# Patient Record
Sex: MALE | NOT HISPANIC OR LATINO | Employment: FULL TIME | URBAN - METROPOLITAN AREA
[De-identification: names, ages, dates, MRNs, and addresses within clinical notes are randomized per-mention and may not be internally consistent; named-entity substitution may affect disease eponyms.]

---

## 2020-10-23 ENCOUNTER — HOSPITAL ENCOUNTER (OUTPATIENT)
Dept: ULTRASOUND IMAGING | Facility: HOSPITAL | Age: 25
Discharge: HOME | End: 2020-10-23
Attending: INTERNAL MEDICINE

## 2020-10-29 ENCOUNTER — HOSPITAL ENCOUNTER (OUTPATIENT)
Dept: LAB | Facility: HOSPITAL | Age: 25
Discharge: HOME | End: 2020-10-29
Attending: INTERNAL MEDICINE

## 2020-10-29 LAB
ALBUMIN SERPL-MCNC: 3.8 G/DL (ref 3.5–5)
ANION GAP SERPL CALC-SCNC: 15 MMOL/L (ref 8–19)
APPEARANCE UR: CLEAR
BASOPHILS # BLD AUTO: 0.07 10*3/UL (ref 0–0.2)
BASOPHILS NFR BLD AUTO: 0.9 % (ref 0–3)
BILIRUB UR QL: NEGATIVE
BUN SERPL-MCNC: 49 MG/DL (ref 5–25)
BUN/CREAT SERPL: 9 {RATIO} (ref 6–20)
CALCIUM SERPL-MCNC: 9.2 MG/DL (ref 8.7–10.4)
CHLORIDE SERPL-SCNC: 108 MMOL/L (ref 99–111)
COLOR UR: YELLOW
CONV ABS IMM GRAN: 0.04 10*3/UL (ref 0–0.2)
CONV BACTERIA: NEGATIVE
CONV CO2: 20 MMOL/L (ref 22–32)
CONV COLLECTION SOURCE (UA): ABNORMAL
CONV CREATININE URINE, RANDOM: 62.4 MG/DL (ref 10–300)
CONV IMMATURE GRAN: 0.5 % (ref 0–1.8)
CONV UROBILINOGEN IN URINE BY AUTOMATED TEST STRIP: 0.2 {EHRLICHU}/DL (ref 0.1–1)
CREAT UR-MCNC: 5.6 MG/DL (ref 0.7–1.2)
DEPRECATED RDW RBC AUTO: 41.8 FL (ref 35.1–43.9)
EOSINOPHIL # BLD AUTO: 0.37 10*3/UL (ref 0–0.7)
EOSINOPHIL # BLD AUTO: 4.8 % (ref 0–7)
ERYTHROCYTE [DISTWIDTH] IN BLOOD BY AUTOMATED COUNT: 12.9 % (ref 11.6–14.4)
GFR SERPLBLD BASED ON 1.73 SQ M-ARVRAT: 13 ML/MIN/{1.73_M2}
GLUCOSE SERPL-MCNC: 103 MG/DL (ref 70–99)
GLUCOSE UR QL: 100 MG/DL
HCT VFR BLD AUTO: 42.5 % (ref 42–52)
HGB BLD-MCNC: 13.3 G/DL (ref 14–18)
HGB UR QL STRIP: ABNORMAL
KETONES UR QL STRIP: NEGATIVE MG/DL
LEUKOCYTE ESTERASE UR QL STRIP: NEGATIVE
LYMPHOCYTES # BLD AUTO: 1.37 10*3/UL (ref 1–5)
LYMPHOCYTES NFR BLD AUTO: 17.9 % (ref 20–45)
MCH RBC QN AUTO: 27.7 PG (ref 27–31)
MCHC RBC AUTO-ENTMCNC: 31.3 G/DL (ref 33–37)
MCV RBC AUTO: 88.4 FL (ref 80–96)
MONOCYTES # BLD AUTO: 0.47 10*3/UL (ref 0.2–1.2)
MONOCYTES NFR BLD AUTO: 6.1 % (ref 3–10)
NEUTROPHILS # BLD AUTO: 5.34 10*3/UL (ref 2–8)
NEUTROPHILS NFR BLD AUTO: 69.8 % (ref 30–85)
NITRITE UR QL STRIP: NEGATIVE
NRBC CBCN: 0 % (ref 0–0.7)
PH UR STRIP.AUTO: 6 [PH] (ref 5–8)
PHOSPHATE SERPL-MCNC: 4.4 MG/DL (ref 2.4–4.5)
PLATELET # BLD AUTO: 256 10*3/UL (ref 130–400)
PMV BLD AUTO: 9.6 FL (ref 9.4–12.4)
POTASSIUM SERPL-SCNC: 4.4 MMOL/L (ref 3.5–5.3)
PROT UR QL: 300 MG/DL
PROT UR-MCNC: 399.6 MG/DL
RBC # BLD AUTO: 4.81 10*6/UL (ref 4.7–6.1)
RBC #/AREA URNS HPF: ABNORMAL /[HPF]
SODIUM SERPL-SCNC: 139 MMOL/L (ref 135–147)
SP GR UR: 1.01 (ref 1–1.03)
WBC # BLD AUTO: 7.66 10*3/UL (ref 4.8–10.8)
WBC #/AREA URNS HPF: ABNORMAL /[HPF]

## 2020-10-30 LAB — PTH-INTACT SERPL-MCNC: 548.5 PG/ML (ref 11.1–79.5)

## 2020-11-09 ENCOUNTER — HOSPITAL ENCOUNTER (OUTPATIENT)
Dept: CT IMAGING | Facility: HOSPITAL | Age: 25
Discharge: HOME | End: 2020-11-09
Attending: UROLOGY

## 2020-11-12 ENCOUNTER — TELEPHONE CONVERTED (OUTPATIENT)
Dept: UROLOGY | Facility: CLINIC | Age: 25
End: 2020-11-12
Attending: UROLOGY

## 2020-11-23 ENCOUNTER — HOSPITAL ENCOUNTER (OUTPATIENT)
Dept: NUCLEAR MEDICINE | Facility: HOSPITAL | Age: 25
Discharge: HOME | End: 2020-11-23
Attending: UROLOGY

## 2020-11-24 ENCOUNTER — HOSPITAL ENCOUNTER (OUTPATIENT)
Dept: NUTRITION | Facility: HOSPITAL | Age: 25
Setting detail: RECURRING SERIES
Discharge: HOME | End: 2021-02-22
Attending: INTERNAL MEDICINE

## 2020-11-25 ENCOUNTER — TELEPHONE CONVERTED (OUTPATIENT)
Dept: UROLOGY | Facility: CLINIC | Age: 25
End: 2020-11-25
Attending: UROLOGY

## 2020-12-03 ENCOUNTER — HOSPITAL ENCOUNTER (OUTPATIENT)
Dept: LAB | Facility: HOSPITAL | Age: 25
Discharge: HOME | End: 2020-12-03
Attending: INTERNAL MEDICINE

## 2020-12-03 LAB
ALBUMIN SERPL-MCNC: 3.6 G/DL (ref 3.5–5)
ANION GAP SERPL CALC-SCNC: 16 MMOL/L (ref 8–19)
APPEARANCE UR: CLEAR
BASOPHILS # BLD AUTO: 0.09 10*3/UL (ref 0–0.2)
BASOPHILS NFR BLD AUTO: 1 % (ref 0–3)
BILIRUB UR QL: NEGATIVE
BUN SERPL-MCNC: 52 MG/DL (ref 5–25)
BUN/CREAT SERPL: 9 {RATIO} (ref 6–20)
CALCIUM SERPL-MCNC: 8.5 MG/DL (ref 8.7–10.4)
CHLORIDE SERPL-SCNC: 107 MMOL/L (ref 99–111)
COLOR UR: YELLOW
CONV ABS IMM GRAN: 0.17 10*3/UL (ref 0–0.2)
CONV BACTERIA: NEGATIVE
CONV CO2: 20 MMOL/L (ref 22–32)
CONV COLLECTION SOURCE (UA): ABNORMAL
CONV CREATININE URINE, RANDOM: 38.6 MG/DL (ref 10–300)
CONV IMMATURE GRAN: 1.9 % (ref 0–1.8)
CONV PROTEIN TO CREATININE RATIO (RANDOM URINE): 7.74 {RATIO} (ref 0–0.1)
CONV UROBILINOGEN IN URINE BY AUTOMATED TEST STRIP: 0.2 {EHRLICHU}/DL (ref 0.1–1)
CREAT UR-MCNC: 5.56 MG/DL (ref 0.7–1.2)
DEPRECATED RDW RBC AUTO: 44 FL (ref 35.1–43.9)
EOSINOPHIL # BLD AUTO: 0.44 10*3/UL (ref 0–0.7)
EOSINOPHIL # BLD AUTO: 4.9 % (ref 0–7)
ERYTHROCYTE [DISTWIDTH] IN BLOOD BY AUTOMATED COUNT: 13.6 % (ref 11.6–14.4)
GFR SERPLBLD BASED ON 1.73 SQ M-ARVRAT: 13 ML/MIN/{1.73_M2}
GLUCOSE SERPL-MCNC: 95 MG/DL (ref 70–99)
GLUCOSE UR QL: 100 MG/DL
HCT VFR BLD AUTO: 40 % (ref 42–52)
HGB BLD-MCNC: 12.7 G/DL (ref 14–18)
HGB UR QL STRIP: ABNORMAL
KETONES UR QL STRIP: NEGATIVE MG/DL
LEUKOCYTE ESTERASE UR QL STRIP: NEGATIVE
LYMPHOCYTES # BLD AUTO: 1.72 10*3/UL (ref 1–5)
LYMPHOCYTES NFR BLD AUTO: 19.2 % (ref 20–45)
MCH RBC QN AUTO: 28.2 PG (ref 27–31)
MCHC RBC AUTO-ENTMCNC: 31.8 G/DL (ref 33–37)
MCV RBC AUTO: 88.7 FL (ref 80–96)
MONOCYTES # BLD AUTO: 0.55 10*3/UL (ref 0.2–1.2)
MONOCYTES NFR BLD AUTO: 6.1 % (ref 3–10)
NEUTROPHILS # BLD AUTO: 5.99 10*3/UL (ref 2–8)
NEUTROPHILS NFR BLD AUTO: 66.9 % (ref 30–85)
NITRITE UR QL STRIP: NEGATIVE
NRBC CBCN: 0 % (ref 0–0.7)
PH UR STRIP.AUTO: 6.5 [PH] (ref 5–8)
PHOSPHATE SERPL-MCNC: 6.4 MG/DL (ref 2.4–4.5)
PLATELET # BLD AUTO: 264 10*3/UL (ref 130–400)
PMV BLD AUTO: 10 FL (ref 9.4–12.4)
POTASSIUM SERPL-SCNC: 4.5 MMOL/L (ref 3.5–5.3)
PROT UR QL: 300 MG/DL
PROT UR-MCNC: 298.8 MG/DL
RBC # BLD AUTO: 4.51 10*6/UL (ref 4.7–6.1)
RBC #/AREA URNS HPF: ABNORMAL /[HPF]
SODIUM SERPL-SCNC: 138 MMOL/L (ref 135–147)
SP GR UR: 1.01 (ref 1–1.03)
WBC # BLD AUTO: 8.96 10*3/UL (ref 4.8–10.8)
WBC #/AREA URNS HPF: ABNORMAL /[HPF]

## 2021-03-22 ENCOUNTER — HOSPITAL ENCOUNTER (OUTPATIENT)
Dept: LAB | Facility: HOSPITAL | Age: 26
Discharge: HOME | End: 2021-03-22
Attending: INTERNAL MEDICINE

## 2021-03-22 LAB
ALBUMIN SERPL-MCNC: 3.7 G/DL (ref 3.5–5)
ANION GAP SERPL CALC-SCNC: 15 MMOL/L (ref 8–19)
BASOPHILS # BLD AUTO: 0.07 10*3/UL (ref 0–0.2)
BASOPHILS NFR BLD AUTO: 0.6 % (ref 0–3)
BUN SERPL-MCNC: 58 MG/DL (ref 5–25)
BUN/CREAT SERPL: 8 {RATIO} (ref 6–20)
CALCIUM SERPL-MCNC: 9 MG/DL (ref 8.7–10.4)
CHLORIDE SERPL-SCNC: 108 MMOL/L (ref 99–111)
CONV ABS IMM GRAN: 0.07 10*3/UL (ref 0–0.2)
CONV CO2: 16 MMOL/L (ref 22–32)
CONV IMMATURE GRAN: 0.6 % (ref 0–1.8)
CREAT UR-MCNC: 7.51 MG/DL (ref 0.7–1.2)
DEPRECATED RDW RBC AUTO: 44 FL (ref 35.1–43.9)
EOSINOPHIL # BLD AUTO: 0.72 10*3/UL (ref 0–0.7)
EOSINOPHIL # BLD AUTO: 6.7 % (ref 0–7)
ERYTHROCYTE [DISTWIDTH] IN BLOOD BY AUTOMATED COUNT: 13.5 % (ref 11.6–14.4)
GFR SERPLBLD BASED ON 1.73 SQ M-ARVRAT: 9 ML/MIN/{1.73_M2}
GLUCOSE SERPL-MCNC: 133 MG/DL (ref 70–99)
HCT VFR BLD AUTO: 40.3 % (ref 42–52)
HGB BLD-MCNC: 12.3 G/DL (ref 14–18)
LYMPHOCYTES # BLD AUTO: 1.47 10*3/UL (ref 1–5)
LYMPHOCYTES NFR BLD AUTO: 13.6 % (ref 20–45)
MCH RBC QN AUTO: 27.6 PG (ref 27–31)
MCHC RBC AUTO-ENTMCNC: 30.5 G/DL (ref 33–37)
MCV RBC AUTO: 90.4 FL (ref 80–96)
MONOCYTES # BLD AUTO: 0.48 10*3/UL (ref 0.2–1.2)
MONOCYTES NFR BLD AUTO: 4.5 % (ref 3–10)
NEUTROPHILS # BLD AUTO: 7.96 10*3/UL (ref 2–8)
NEUTROPHILS NFR BLD AUTO: 74 % (ref 30–85)
NRBC CBCN: 0 % (ref 0–0.7)
PHOSPHATE SERPL-MCNC: 6.8 MG/DL (ref 2.4–4.5)
PLATELET # BLD AUTO: 281 10*3/UL (ref 130–400)
PMV BLD AUTO: 10 FL (ref 9.4–12.4)
POTASSIUM SERPL-SCNC: 3.9 MMOL/L (ref 3.5–5.3)
RBC # BLD AUTO: 4.46 10*6/UL (ref 4.7–6.1)
SODIUM SERPL-SCNC: 135 MMOL/L (ref 135–147)
WBC # BLD AUTO: 10.77 10*3/UL (ref 4.8–10.8)

## 2021-03-25 ENCOUNTER — OFFICE VISIT (OUTPATIENT)
Dept: SURGERY | Facility: CLINIC | Age: 26
End: 2021-03-25

## 2021-03-25 VITALS — HEIGHT: 70 IN | BODY MASS INDEX: 45.1 KG/M2 | WEIGHT: 315 LBS

## 2021-03-25 DIAGNOSIS — L73.2 HYDRADENITIS: Primary | ICD-10-CM

## 2021-03-25 PROCEDURE — 99244 OFF/OP CNSLTJ NEW/EST MOD 40: CPT | Performed by: SURGERY

## 2021-03-25 RX ORDER — FUROSEMIDE 20 MG/1
20 TABLET ORAL DAILY
COMMUNITY

## 2021-03-25 RX ORDER — SERTRALINE HYDROCHLORIDE 100 MG/1
100 TABLET, FILM COATED ORAL NIGHTLY
COMMUNITY

## 2021-03-25 RX ORDER — ERGOCALCIFEROL 1.25 MG/1
50000 CAPSULE ORAL WEEKLY
COMMUNITY

## 2021-03-25 RX ORDER — CALCIUM ACETATE 667 MG/1
1334 CAPSULE ORAL 3 TIMES DAILY
COMMUNITY

## 2021-03-25 RX ORDER — AMLODIPINE BESYLATE 10 MG/1
10 TABLET ORAL DAILY
COMMUNITY

## 2021-03-25 NOTE — PROGRESS NOTES
Chief Complaint   Patient presents with   • PD Catheter Consultation       Subjective      Moe Castano is a 25 y.o. male who is referred by Dr. Boston for evaluation of peritoneal dialysis catheter placement.  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  The patient has been having worsening chronic kidney disease since he was born.  He is close to needing dialysis.  He reports having hidradenitis suppurativa.  He has been treated with antibiotics but cannot recall which type of antibiotic he is taking.  Patient cannot recall any medication he is taking.  He reports no intra-abdominal infection.  Has never had a colonoscopy.  Reports no constipation having regular bowel movements.    Home Evaluation: No    Past Medical History:   Diagnosis Date   • Anxiety    • Congenital absence of kidney    • Gout    • Hypertension    • Hyperthyroidism    • Kidney failure     stage 5   • Single pelvic kidney        Past Surgical History:   Procedure Laterality Date   • KIDNEY SURGERY  2004    excess fluid drained off of kidney       MEDS:   -Does not recall medications he is taking    Allergies   Allergen Reactions   • Shellfish-Derived Products Anaphylaxis       Family History   Problem Relation Age of Onset   • Diabetes Mother    • Heart disease Father         with pacemaker   • Hypertension Father    • Kidney failure Father    • Diabetes Father    • Arthritis Father        Social History     Socioeconomic History   • Marital status: Single     Spouse name: Not on file   • Number of children: Not on file   • Years of education: Not on file   • Highest education level: Not on file   Tobacco Use   • Smoking status: Never Smoker   • Smokeless tobacco: Never Used   Vaping Use   • Vaping Use: Never used   Substance and Sexual Activity   • Alcohol use: Never   • Drug use: Never   • Sexual activity: Defer     REVIEW OF SYSTEMS    Review of Systems   Constitutional: Negative for activity change and unexpected  "weight change.   HENT: Negative for congestion and hearing loss.    Eyes: Negative for discharge and itching.   Respiratory: Negative for apnea, choking and chest tightness.    Cardiovascular: Negative for chest pain and leg swelling.   Gastrointestinal: Negative for abdominal distention, anal bleeding and blood in stool.   Endocrine: Negative for cold intolerance and heat intolerance.   Genitourinary: Negative for difficulty urinating and hematuria.   Musculoskeletal: Negative for arthralgias and joint swelling.   Skin: Negative for color change and pallor.   Allergic/Immunologic: Negative for environmental allergies and food allergies.   Neurological: Negative for dizziness and numbness.   Hematological: Negative for adenopathy. Does not bruise/bleed easily.   Psychiatric/Behavioral: Negative for agitation and hallucinations.       Physical Examination  Ht 177.8 cm (70\")   Wt (!) 144 kg (317 lb)   BMI 45.48 kg/m²   Body mass index is 45.48 kg/m².  Physical Exam  Constitutional:       Appearance: He is obese.   HENT:      Head: Normocephalic and atraumatic.      Mouth/Throat:      Mouth: Mucous membranes are moist.      Pharynx: Oropharynx is clear.   Eyes:      General: No scleral icterus.     Conjunctiva/sclera: Conjunctivae normal.   Cardiovascular:      Rate and Rhythm: Normal rate and regular rhythm.      Pulses: Normal pulses.      Heart sounds: Normal heart sounds.   Pulmonary:      Effort: Pulmonary effort is normal.      Breath sounds: Normal breath sounds.   Abdominal:      General: Abdomen is flat. Bowel sounds are normal.      Palpations: There is no mass.      Hernia: No hernia is present.      Comments: There is bilateral groin boils with active purulent drainage and surrounding erythema   Musculoskeletal:         General: Normal range of motion.      Cervical back: Normal range of motion and neck supple.   Skin:     General: Skin is warm and dry.   Neurological:      General: No focal deficit " present.      Mental Status: He is alert. Mental status is at baseline.   Psychiatric:         Mood and Affect: Mood normal.         Thought Content: Thought content normal.         Assessment:   Moe Castano is a 25 y.o. male with kidney disease.we will need to have dialysis soon.  Unfortunately, he has active hidradenitis instruction.  He does not know which kind of antibiotic he is taking.  He has not been seen by a dermatologist.  I do not think placing a peritoneal dialysis catheter is great idea in the setting of active skin infection.  Discussed with the patient about this and also discussed it with Dr. Boston over the phone.  I will refer him to dermatology for treatment of hidradenitis suppurativa.  I think that if his hidradenitis suppurativa is under control then he should be able to undergo peritoneal dialysis catheter.  In the meantime, unfortunately he may need to have temporary hemodialysis.      Plan:     -Dermatology referral  -Follow-up in my office when infection under control    Orders Placed This Encounter   Procedures   • Ambulatory Referral to Dermatology     Referral Priority:   Routine     Referral Type:   Consultation     Referral Reason:   Specialty Services Required     Requested Specialty:   Dermatology     Number of Visits Requested:   1         Hever Chan MD  General, Minimally Invasive and Endoscopic Surgery  [facility]    [address], [address]  [place], [place], [address]  P: [phone]  F: [phone]

## 2021-03-26 ENCOUNTER — HOSPITAL ENCOUNTER (OUTPATIENT)
Dept: PREADMISSION TESTING | Facility: HOSPITAL | Age: 26
Discharge: HOME | End: 2021-03-26
Attending: SURGERY

## 2021-03-26 LAB — SARS-COV-2 RNA SPEC QL NAA+PROBE: NOT DETECTED

## 2021-03-29 ENCOUNTER — HOSPITAL ENCOUNTER (OUTPATIENT)
Dept: PERIOP | Facility: HOSPITAL | Age: 26
Discharge: HOME | End: 2021-03-29
Attending: SURGERY

## 2021-03-29 LAB
ANION GAP SERPL CALC-SCNC: 19 MMOL/L (ref 8–19)
APTT BLD: 26.3 S (ref 22.2–34.2)
BASOPHILS # BLD AUTO: 0.09 10*3/UL (ref 0–0.2)
BASOPHILS NFR BLD AUTO: 0.9 % (ref 0–3)
BUN SERPL-MCNC: 65 MG/DL (ref 5–25)
BUN/CREAT SERPL: 9 {RATIO} (ref 6–20)
CALCIUM SERPL-MCNC: 8.6 MG/DL (ref 8.7–10.4)
CHLORIDE SERPL-SCNC: 109 MMOL/L (ref 99–111)
CONV ABS IMM GRAN: 0.16 10*3/UL (ref 0–0.2)
CONV CO2: 14 MMOL/L (ref 22–32)
CONV IMMATURE GRAN: 1.6 % (ref 0–1.8)
CREAT UR-MCNC: 7.58 MG/DL (ref 0.7–1.2)
DEPRECATED RDW RBC AUTO: 42 FL (ref 35.1–43.9)
EOSINOPHIL # BLD AUTO: 0.89 10*3/UL (ref 0–0.7)
EOSINOPHIL # BLD AUTO: 8.7 % (ref 0–7)
ERYTHROCYTE [DISTWIDTH] IN BLOOD BY AUTOMATED COUNT: 13.5 % (ref 11.6–14.4)
GFR SERPLBLD BASED ON 1.73 SQ M-ARVRAT: 9 ML/MIN/{1.73_M2}
GLUCOSE SERPL-MCNC: 104 MG/DL (ref 70–99)
HCT VFR BLD AUTO: 37.4 % (ref 42–52)
HGB BLD-MCNC: 12.1 G/DL (ref 14–18)
INR PPP: 0.94 (ref 2–3)
LYMPHOCYTES # BLD AUTO: 1.38 10*3/UL (ref 1–5)
LYMPHOCYTES NFR BLD AUTO: 13.6 % (ref 20–45)
MCH RBC QN AUTO: 28 PG (ref 27–31)
MCHC RBC AUTO-ENTMCNC: 32.4 G/DL (ref 33–37)
MCV RBC AUTO: 86.6 FL (ref 80–96)
MONOCYTES # BLD AUTO: 0.47 10*3/UL (ref 0.2–1.2)
MONOCYTES NFR BLD AUTO: 4.6 % (ref 3–10)
NEUTROPHILS # BLD AUTO: 7.19 10*3/UL (ref 2–8)
NEUTROPHILS NFR BLD AUTO: 70.6 % (ref 30–85)
NRBC CBCN: 0 % (ref 0–0.7)
OSMOLALITY SERPL CALC.SUM OF ELEC: 305 MOSM/KG (ref 273–304)
PLATELET # BLD AUTO: 265 10*3/UL (ref 130–400)
PMV BLD AUTO: 9.9 FL (ref 9.4–12.4)
POTASSIUM SERPL-SCNC: 4.4 MMOL/L (ref 3.5–5.3)
PROTHROMBIN TIME: 10.4 S (ref 9.4–12)
RBC # BLD AUTO: 4.32 10*6/UL (ref 4.7–6.1)
SODIUM SERPL-SCNC: 138 MMOL/L (ref 135–147)
WBC # BLD AUTO: 10.18 10*3/UL (ref 4.8–10.8)

## 2021-04-01 LAB
CONV HEPATITIS B SURFACE AG W CONFIRMATION RE: NEGATIVE
HAV IGM SERPL QL IA: NEGATIVE
HBV CORE IGM SERPL QL IA: NEGATIVE
HCV AB SER DONR QL: <0.1 S/CO RATIO (ref 0–0.9)

## 2021-05-10 NOTE — H&P
History and Physical      Patient Name: Moe Burt   Patient ID: [id number]   Sex: Male   YOB: 1995        Visit Date: November 12, 2020    Provider: Gabi Greer MD   Location: [facility]   Location Address: [address] [place] [address]  [id number]   Location Phone: [phone]          Chief Complaint  · Pt here today for urological concerns     Telephone Visit- presents for evaluation via telephone.   Verbal consent obtained before beginning visit.   The following staff were present during this visit: Stacey Tomas LPN  Total time for encounter: 22 minutes       History Of Present Illness     25-year-old gentleman presents to establish urologic care.  Patient has history of chronic kidney disease classified as stage V.  History of renal failure starting in childhood.  Dx with solitary right pelvic kidney at age 7. History of nephrostomy tube placement and presumed pyeloplasty in [place] at about age 8.  Operative report available from 2003, dismembered pyeloplasty.  At 16yo dx with CKD III (GFR 44-32) which got progressively worse.  Recently, upon establishing care with PCP, routine labs were checked and patient was noted to have significantly elevated creatinine.  He was subsequently sent to nephrology, Dr. Boston for evaluation where renal ultrasound was obtained.    Feeling well today. Had CT scan prior to today's visit.  Denies abdominal or back pain.  Denies dysuria.  Notes urinary urgency and frequency but drinks up to a gallon of water a day.  Denies sensation of incomplete emptying.  Not currently on dialysis; starting dialysis discussed with nephrology.   Denies history of nephrolithiasis.    _________________  Creatinine,   10/29/2020: 5.6 GFR 13  10/20/20: 6.3 GFR 11    CT abdomen pelvis without contrast, 11/9/2020: Solitary right pelvic kidney with mild prominence of the collecting system with no evidence of   significant calyceal dilatation to  suggest hydronephrosis.  No obstructing calculus identified.  No   remote previous imaging available for comparison.  No acute process.    Renal ultrasound, 10/23/2020: left absent kidney, right kidney is located in the pelvis area increased echogenicity of the cortex suggesting chronic renal disease.  Lobulated contour of the right kidney.  Moderate dilation of the right renal pelvis of uncertain significance.  Unremarkable bladder.       Past Medical History  ADHD (attention deficit hyperactivity disorder); CKD (chronic kidney disease); Gout; Hypertension; Sleep apnea         Past Surgical History  Urostomy         Medication List  amlodipine 5 mg oral tablet; Vitamin D2 1,250 mcg (50,000 unit) oral capsule; Zoloft 50 mg oral tablet         Allergy List  NO KNOWN DRUG ALLERGIES; Seafood       Allergies Reconciled  Family Medical History  Liver Disorder; Heart Disease; Hypertension; Thyroid disorder; Kidney Disease; Diabetes         Social History  Tobacco (Never)         Review of Systems  · Constitutional  o Denies  o : chills, fever  · Gastrointestinal  o Denies  o : nausea, vomiting          Results     [facility]      PACS RADIOLOGY REPORT    Patient: DEXTER NELSON Acct: #[id number] Report: #[id number]    UNIT #: [id number]  DOS: 20 1606  INSURANCE:BLUE ACCESS NETWORK - [facility] ORDER #:[id number]  LOCATION:CT   : 1995    PROVIDERS  ADMITTING:   ATTENDING: RAMÍREZ FORREST  FAMILY:  NONE,MD ORDERING:  RAMÍREZ FORREST   OTHER:  DICTATING:  Kamryn Patel MD    REQ #:[id number]   EXAM:ABDPELWO - CT ABDOMEN PELVIS wo CONTRAST  REASON FOR EXAM:  HYRDRONEPHROSIS/SOLITARY KIDNEY  REASON FOR VISIT:  HYRDRONEPHROSIS/SOLITARY KIDNEY    *******Signed******     PROCEDURE: CT ABDOMEN PELVIS WITHOUT CONTRAST     COMPARISON: [facility], , US BILAT KIDNEYS, 10/23/2020, 15:04.     INDICATIONS: HYRDRONEPHROSIS/SOLITARY KIDNEY     TECHNIQUE: CT images  were created without intravenous contrast.       PROTOCOL:   Standard imaging protocol performed      RADIATION:   DLP: 1350mGy*cm    Automated exposure control was utilized to minimize radiation dose.      FINDINGS:   The visualized portions of the lung bases demonstrate no acute process.      The liver, pancreas and spleen demonstrate no acute process.  The gallbladder appears unremarkable.    No evidence of biliary dilatation. The adrenal glands appear unremarkable.  A solitary right   pelvic kidney is present.  There is mild prominence of the collecting system with no evidence of   calyceal dilatation.  No evidence of stones.  No evidence of stranding.     No dilated  loops of bowel identified.  No evidence of obstruction.  No free air.  No mesenteric   fluid collections identified.  The appendix appears unremarkable.  No suspicious adenopathy   identified.  No significant free fluid.  The pelvic organs demonstrate no acute process.  No acute   osseous abnormality is identified.        CONCLUSION:   1. Solitary right pelvic kidney with mild prominence of the collecting system with no evidence of   significant calyceal dilatation to suggest hydronephrosis.  No obstructing calculus identified.  No   remote previous imaging available for comparison.  No acute process..  2. Ancillary findings as described above.         STEFANIE GEE MD         Electronically Signed and Approved By: STEFANIE GEE MD on 11/09/2020 at 16:24                     Until signed, this is an unconfirmed preliminary report that may contain  errors and is subject to change.                KOGIL:  D:11/09/20 1623         Assessment  · Pelvic kidney     753.3/Q63.2  · Solitary kidney, congenital     753.0/Q60.0  · CKD stage 5       Chronic kidney disease, stage 5     250.41/N18.5    Problems Reconciled  Plan  · Orders  o Physician Telephone Evaluation, 21-30 minutes (17555) - 753.3/Q63.2, 753.0/Q60.0, 250.41/N18.5 -  11/12/2020  · Medications  o Medications have been Reconciled  o Transition of Care or Provider Policy  · Instructions  o Electronically Identified Patient Education Materials Provided Electronically     Right solitary pelvic kidneyCT imaging reviewed as well as prior records available.    Patient with prominence of the right renal pelvis and collecting system without evidence to suggest significant hydronephrosis or obstructive uropathy.  No nephrolithiasis.  Discussed that these changes may be due to prior pyeloplasty.  Recommend obtaining MAG3 Lasix renal scan to assess for obstruction which if present could potentially be optimized in an attempt to improve renal function.  If MAG3 Lasix renal scan is without significant obstruction, then will likely warrant renal replacement therapy with future transplant evaluation.  Patient participate in the discussion and notes understanding.    Obtain MAG3 Lasix renal scan follow-up phone visit after  All questions addressed             Electronically Signed by: Gabi Greer MD -Author on November 12, 2020 06:11:27 PM

## 2021-05-13 NOTE — PROGRESS NOTES
Progress Note      Patient Name: Moe Burt   Patient ID: [id number]   Sex: Male   YOB: 1995        Visit Date: November 25, 2020    Provider: Gabi Greer MD   Location: [facility]   Location Address: [address] [place] [address]  [id number]   Location Phone: [phone]          Chief Complaint  · Pt here today for urological concerns     Telephone Visit- presents for evaluation via telephone.   Verbal consent obtained before beginning visit.   The following staff were present during this visit: Stacey Tomas LPN  Total time for encounter: 8 minutes       History Of Present Illness     25-year-old gentleman presents to establish urologic care.  Patient has history of chronic kidney disease classified as stage V.  History of renal failure starting in childhood.  Dx with solitary right pelvic kidney at age 7. History of nephrostomy tube placement and presumed pyeloplasty in [place] at about age 8.  Operative report available from 2003, dismembered pyeloplasty.  At 18yo dx with CKD III (GFR 44-32) which got progressively worse.  Recently, upon establishing care with PCP, routine labs were checked and patient was noted to have significantly elevated creatinine.  He was subsequently sent to nephrology, Dr. Boston for evaluation where renal ultrasound was obtained.    Feeling well today. Had CT scan prior to today's visit.  Denies abdominal or back pain.  Denies dysuria.  Notes urinary urgency and frequency but drinks up to a gallon of water a day.  Denies sensation of incomplete emptying.  Not currently on dialysis; starting dialysis discussed with nephrology.   Denies history of nephrolithiasis.    Update 11/25/20: Presents for follow-up with Lasix renal scan prior.  Denies changes since last visit.  No complaints today.  Patient has consultation with transplant service in [place] on Monday.    _________________  MAG3 Lasix scan, 11/23/2020:1. A solitary  right pelvic kidney.  Time activity curves are somewhat inaccurate due to the fact that the kidney overlies the right iliac artery and vein where there is persistent activity   throughout this exam.  Images suggest fairly rapid washout of activity from the right renal pelvis   following the administration of Lasix.  Mechanical obstruction of the right kidney is felt to be   unlikely.     Creatinine,   10/29/2020: 5.6 GFR 13  10/20/20: 6.3 GFR 11    CT abdomen pelvis without contrast, 11/9/2020: Solitary right pelvic kidney with mild prominence of the collecting system with no evidence of   significant calyceal dilatation to suggest hydronephrosis.  No obstructing calculus identified.  No   remote previous imaging available for comparison.  No acute process.    Renal ultrasound, 10/23/2020: left absent kidney, right kidney is located in the pelvis area increased echogenicity of the cortex suggesting chronic renal disease.  Lobulated contour of the right kidney.  Moderate dilation of the right renal pelvis of uncertain significance.  Unremarkable bladder.       Past Medical History  ADHD (attention deficit hyperactivity disorder); CKD (chronic kidney disease); Gout; Hypertension; Sleep apnea         Past Surgical History  Urostomy         Medication List  Risperdal oral; Vitamin D2 1,250 mcg (50,000 unit) oral capsule; Zoloft 50 mg oral tablet         Allergy List  NO KNOWN DRUG ALLERGIES; Seafood       Allergies Reconciled  Family Medical History  Liver Disorder; Heart Disease; Hypertension; Thyroid disorder; Kidney Disease; Diabetes         Social History  Tobacco (Never)         Review of Systems  · Constitutional  o Denies  o : chills, fever  · Gastrointestinal  o Denies  o : nausea, vomiting          Results     20   min.).    BLADDER: Normal.       CONCLUSION:   1. A solitary right pelvic kidney.  Time activity curves are somewhat inaccurate due to the fact   that the kidney overlies the right iliac artery and  "vein where there is persistent activity   throughout this exam.  Images suggest fairly rapid washout of activity from the right renal pelvis   following the administration of Lasix.  Mechanical obstruction of the right kidney is felt to be   unlikely.            MICHAEL MACK MD         Electronically Signed and Approved By: MICHAEL MACK MD on 2020 at 14:55                     Until signed, this is an unconfirmed preliminary report that may contain  errors and is subject to change.                STEBA:  D:20 1456  \">[facility]      PACS RADIOLOGY REPORT    Patient: DEXTER NELSON Acct: #[id number] Report: #[id number]    UNIT #: [id number]  DOS: 20 1325  INSURANCE:BLUE ACCESS NETWORK - [facility] ORDER #:[id number]  LOCATION:NM   : 1995    PROVIDERS  ADMITTING:   ATTENDING: RAMÍREZ FORREST  FAMILY:  NONE,MD ORDERING:  RAMÍREZ FORREST   OTHER:  DICTATING:  Mo Mack MD    REQ #:[id number]   EXAM:RENSL - RENAL SCAN With LASIX  REASON FOR EXAM:  HYDRONEPHROSIS/CHRONIC RENAL DISEASE  REASON FOR VISIT:  HYDRONEPHROSIS/CHRONIC RENAL DISEASE    *******Signed******     PROCEDURE: RENAL WITH LASIX     COMPARISON: [facility], CT, ABD PEL W/O CONTRAST, 2020, 16:13.     INDICATIONS: HYDRONEPHROSIS/CHRONIC RENAL DISEASE     TECHNIQUE: After obtaining the patient's consent, a bolus intravenous injection of Tc-99m MAG-3 was   administered.  Sequential renal flow images were performed followed by sequential static images.    Quantitative analysis was performed of both kidneys. Subsequently, the patient was given   intravenous Lasix for determination of renal washout.    RADIONUCLIDE:         10.1MCI    Tc99m Mag3 - I.V.     FINDINGS:      There is a solitary pelvic kidney on the right.  ARTERIAL PERFUSION: Within normal limits   TIME TO PEAK (RIGHT): 0.9 minutes Normal-less than five minutes.    UPTAKE/FUNCTION: Right  100 %.    T " 1/2 POST-LASIX (RIGHT): Greater than 20 minutes .  This may be somewhat artifactual as the right   kidney overlies the right iliac artery and vein which demonstrate persistent activity throughout   this exam.  Images do demonstrate activity accumulating within the right renal pelvis with fairly   rapid washout of this activity after the administration of Lasix.  This suggests that there is   likely no significant mechanical obstruction  (Normal < 10 min, equivocal 10-20 min, abnormal > 20   min.).    BLADDER: Normal.       CONCLUSION:   1. A solitary right pelvic kidney.  Time activity curves are somewhat inaccurate due to the fact   that the kidney overlies the right iliac artery and vein where there is persistent activity   throughout this exam.  Images suggest fairly rapid washout of activity from the right renal pelvis   following the administration of Lasix.  Mechanical obstruction of the right kidney is felt to be   unlikely.            MICHAEL MACK MD         Electronically Signed and Approved By: MICHAEL MACK MD on 11/23/2020 at 14:55                     Until signed, this is an unconfirmed preliminary report that may contain  errors and is subject to change.                STEBA:  D:11/23/20 1456         Assessment  · Pelvic kidney     753.3/Q63.2  · Solitary kidney, congenital     753.0/Q60.0  · CKD stage 5       Chronic kidney disease, stage 5     250.41/N18.5    Problems Reconciled  Plan  · Orders  o Physican Telephone evaluation, 5-10 min (56697) - 753.3/Q63.2, 753.0/Q60.0, 250.41/N18.5 - 11/25/2020  · Medications  o Medications have been Reconciled  o Transition of Care or Provider Policy  · Instructions  o Electronically Identified Patient Education Materials Provided Electronically     Right solitary pelvic kidney-MAG3 Lasix renal scan imaging and results reviewed, discussed with patient at length.  Do not believe there to be an obstructive component of patient's current CKD.  Believe his renal  function to be at baseline.  Do not believe urologic intervention is warranted and or would allow for optimization of that renal unit.  No further urologic intervention or work-up is warranted at this time.  Agree with transplant consultation  Will defer further management and work-up to nephrology and transplant  Encourage patient to follow-up with urology as needed   All questions addressed             Electronically Signed by: Gabi Greer MD -Author on November 25, 2020 05:14:34 PM